# Patient Record
Sex: MALE | Race: WHITE | NOT HISPANIC OR LATINO | URBAN - METROPOLITAN AREA
[De-identification: names, ages, dates, MRNs, and addresses within clinical notes are randomized per-mention and may not be internally consistent; named-entity substitution may affect disease eponyms.]

---

## 2021-11-12 ENCOUNTER — PREPPED CHART (OUTPATIENT)
Dept: URBAN - METROPOLITAN AREA CLINIC 74 | Facility: CLINIC | Age: 76
End: 2021-11-12

## 2021-11-12 PROBLEM — Z96.1 PSEUDOPHAKIA: Noted: 2021-11-12

## 2021-11-12 PROBLEM — H43.813 POSTERIOR VITREOUS DETACHMENT: Noted: 2021-11-12

## 2021-11-12 PROBLEM — H35.372 EPIRETINAL MEMBRANE: Noted: 2021-11-12

## 2021-11-12 PROBLEM — H25.11 NS CATARACT: Noted: 2021-11-12

## 2022-08-11 ASSESSMENT — VISUAL ACUITY
OD_CC: 20/20
OS_CC: 20/80-2

## 2022-08-11 ASSESSMENT — TONOMETRY
OS_IOP_MMHG: 10
OD_IOP_MMHG: 12

## 2022-12-30 ENCOUNTER — FOLLOW UP (OUTPATIENT)
Dept: URBAN - METROPOLITAN AREA CLINIC 74 | Facility: CLINIC | Age: 77
End: 2022-12-30

## 2022-12-30 DIAGNOSIS — H40.013: ICD-10-CM

## 2022-12-30 DIAGNOSIS — H35.372: ICD-10-CM

## 2022-12-30 DIAGNOSIS — H43.813: ICD-10-CM

## 2022-12-30 PROCEDURE — 92014 COMPRE OPH EXAM EST PT 1/>: CPT

## 2022-12-30 PROCEDURE — 92134 CPTRZ OPH DX IMG PST SGM RTA: CPT

## 2022-12-30 PROCEDURE — 92202 OPSCPY EXTND ON/MAC DRAW: CPT

## 2022-12-30 ASSESSMENT — VISUAL ACUITY
OD_CC: 20/30
OS_CC: 20/80-1

## 2022-12-30 ASSESSMENT — TONOMETRY
OD_IOP_MMHG: 10
OS_IOP_MMHG: 9

## 2024-02-13 ENCOUNTER — APPOINTMENT (RX ONLY)
Dept: URBAN - METROPOLITAN AREA CLINIC 37 | Facility: CLINIC | Age: 79
Setting detail: DERMATOLOGY
End: 2024-02-13

## 2024-02-13 DIAGNOSIS — B07.8 OTHER VIRAL WARTS: ICD-10-CM

## 2024-02-13 DIAGNOSIS — L81.4 OTHER MELANIN HYPERPIGMENTATION: ICD-10-CM

## 2024-02-13 DIAGNOSIS — D22 MELANOCYTIC NEVI: ICD-10-CM

## 2024-02-13 DIAGNOSIS — D18.0 HEMANGIOMA: ICD-10-CM

## 2024-02-13 DIAGNOSIS — L82.1 OTHER SEBORRHEIC KERATOSIS: ICD-10-CM

## 2024-02-13 PROBLEM — D18.01 HEMANGIOMA OF SKIN AND SUBCUTANEOUS TISSUE: Status: ACTIVE | Noted: 2024-02-13

## 2024-02-13 PROBLEM — D22.5 MELANOCYTIC NEVI OF TRUNK: Status: ACTIVE | Noted: 2024-02-13

## 2024-02-13 PROCEDURE — 17110 DESTRUCTION B9 LES UP TO 14: CPT

## 2024-02-13 PROCEDURE — ? COUNSELING

## 2024-02-13 PROCEDURE — ? LIQUID NITROGEN

## 2024-02-13 PROCEDURE — 99203 OFFICE O/P NEW LOW 30 MIN: CPT | Mod: 25

## 2024-02-13 PROCEDURE — ? TREATMENT REGIMEN

## 2024-02-13 ASSESSMENT — LOCATION SIMPLE DESCRIPTION DERM
LOCATION SIMPLE: RIGHT ELBOW
LOCATION SIMPLE: CHEST
LOCATION SIMPLE: RIGHT FOREARM
LOCATION SIMPLE: ABDOMEN
LOCATION SIMPLE: UPPER BACK
LOCATION SIMPLE: LEFT FOREARM

## 2024-02-13 ASSESSMENT — LOCATION DETAILED DESCRIPTION DERM
LOCATION DETAILED: STERNUM
LOCATION DETAILED: LEFT LATERAL INFERIOR CHEST
LOCATION DETAILED: RIGHT ELBOW
LOCATION DETAILED: RIGHT DISTAL DORSAL FOREARM
LOCATION DETAILED: EPIGASTRIC SKIN
LOCATION DETAILED: INFERIOR THORACIC SPINE
LOCATION DETAILED: LEFT PROXIMAL DORSAL FOREARM
LOCATION DETAILED: RIGHT LATERAL INFERIOR CHEST

## 2024-02-13 ASSESSMENT — LOCATION ZONE DERM
LOCATION ZONE: TRUNK
LOCATION ZONE: ARM

## 2024-02-13 NOTE — PROCEDURE: LIQUID NITROGEN
Show Spray Paint Technique Variable?: Yes
Render Note In Bullet Format When Appropriate: No
Post-Care Instructions: Patient is to wear sunprotection, and avoid picking at any of the treated lesions. Pt may apply Vaseline to crusted or scabbing areas.
Consent: The patient's consent was obtained including but not limited to risks of crusting, scabbing, blistering, scarring, darker or lighter pigmentary change, recurrence, incomplete removal and infection.
Medical Necessity Clause: This procedure was medically necessary because the lesions that were treated were:
Spray Paint Text: The liquid nitrogen was applied to the skin utilizing a spray paint frosting technique.
Detail Level: Detailed
Medical Necessity Information: It is in your best interest to select a reason for this procedure from the list below. All of these items fulfill various CMS LCD requirements except the new and changing color options.

## 2024-10-28 ENCOUNTER — FOLLOW UP (OUTPATIENT)
Dept: URBAN - METROPOLITAN AREA CLINIC 74 | Facility: CLINIC | Age: 79
End: 2024-10-28

## 2024-10-28 DIAGNOSIS — H43.813: ICD-10-CM

## 2024-10-28 DIAGNOSIS — H35.372: ICD-10-CM

## 2024-10-28 DIAGNOSIS — H40.013: ICD-10-CM

## 2024-10-28 PROCEDURE — 92014 COMPRE OPH EXAM EST PT 1/>: CPT

## 2024-10-28 PROCEDURE — 92134 CPTRZ OPH DX IMG PST SGM RTA: CPT

## 2024-10-28 PROCEDURE — 92202 OPSCPY EXTND ON/MAC DRAW: CPT

## 2024-10-28 ASSESSMENT — VISUAL ACUITY
OD_CC: 20/20-2
OS_CC: 20/40+2

## 2024-10-28 ASSESSMENT — TONOMETRY
OS_IOP_MMHG: 16
OD_IOP_MMHG: 14